# Patient Record
Sex: FEMALE | Race: BLACK OR AFRICAN AMERICAN | URBAN - METROPOLITAN AREA
[De-identification: names, ages, dates, MRNs, and addresses within clinical notes are randomized per-mention and may not be internally consistent; named-entity substitution may affect disease eponyms.]

---
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
## 2022-01-01 NOTE — PROGRESS NOTES
Chief Complaint   Patient presents with    Follow-up     1. Have you been to the ER, urgent care clinic since your last visit? Hospitalized since your last visit? No    2. Have you seen or consulted any other health care providers outside of the [address] [place] [address] since your last visit? Include any pap smears or colon screening.  No Progress NOTE  Date of Service: 2022  Christopher Mayorga MRN: [id number] [facility]: [id number]   Physical Exam  DOL: 44 GA: 35 wks 0 d CGA: 40 wks 4 d   BW: 2217 Weight: 2920 Change 24h: -105 Change 7d: 185   Place of Service: NICU Bed Type: Open Crib  Intensive Cardiac and respiratory monitoring, continuous and/or frequent vital sign monitoring  Vitals / Measurements: T: 98.5 HR: 179 RR: 50 BP: 100/55 (72)  Length: 46.7 (Change 24 hrs: --)OFC: 32 (Change 24 hrs: --)  General Exam: Infant is alert and active. Head/Neck: Anterior fontanel is soft and flat. Chest: Clear, equal breath sounds in room air. Comfortable. Heart: RRR. No murmur. Well perfused. Abdomen: Soft, non distended, non tender with active bowel sounds  Genitalia: Normal external male. Prior circ healing. Extremities: No deformities noted. Normal range of motion for all extremities. Neurologic: Mild generalized hypertonia  Skin: Pink, intact with no rashes, vesicles, or other lesions are noted. Procedures:     Medication  Active Medications:  Lactobacillus, Start Date: 2022, Duration: 36    Respiratory Support:   Type: Room Air Start Date: 2022Duration: 44    FEN/Nutrition   Daily Weight (g): 2920 Dry Weight (g): 2920 Weight Gain Over 7 Days (g): 165   Intake   Prior Enteral (Total Enteral: 138.7 mL/kg/d)   Base Feeding: FormulaSubtype Feeding: Similac SensitiveCal/Oz: 26Route: PO   mL/Feed: 50.7Feeds/d: 8mL/hr: 16.9Total (mL): 405Total (mL/kg/d): 138.7  Feeding Comment: PO ad aggie  Planned Enteral (Total Enteral: - mL/kg/d)   Base Feeding: FormulaSubtype Feeding: Similac SensitiveCal/Oz: 26Route: PO   Feeds/d: 8Total (mL): -Total (mL/kg/d): -  Output   Number of Voids: 9  Total Output     Stools: 4Last Stool Date: 2022    Diagnoses  System: FEN/GI   Diagnosis: Nutritional Support starting 2022         Assessment: Infant on all PO feeds since 12/13. He took ~140 mL/kg in the past 24 hours.  He's receiving 32 margaret/oz Similar Total Care Sensitive. Weight down 105 grams overnight, however weight  charted inconsistent with overall growth pattern and may be inaccurate. On prune juice and biogaia. Overall weight gain has been very consistent with infant gaining weight ~5th%ile. 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 Cocaine Abuse      Microcephaly (Q02) starting 2022       Comment: <1%tile       Assessment: Morphine D'cd on . OMAR scores 1-9 over the past 24 hours. Infant consolable. Sleeping between feeds and PO feeding well. Plan: Continue to assess MFS every 3 - 4 hours after feeding  monitor minimum of 48 hrs off morphine      Neuroimaging  Date: 2022Type: Cranial Ultrasound  Grade-L: NormalGrade-R: Normal  Comment: Normal results    Date: 2022Type: MRI  Grade-L: NormalGrade-R: Normal        System: Gestation   Diagnosis: Late  Infant 35 wks (P07.38) starting 2022       Comment: GA based on Mcdaniels assessment. No Prenatal Care (P00.9) starting 2022        Prematurity [id number] gm (P07.18) starting 2022         Assessment: Estimated 35wkr now 40 4/7 weeks PMA. He is stable in an open crib,  in room air, and now on all PO feedings. Trialing off pharmacologic management of Lisandro Sender has custody of infant, foster family identified and visiting. Plan: Continue NICU care, update foster family PRN.   Routine  screening prior to discharge  Continue consultation with Case Management and CPS  room in [facility] with foster family  tentative discharge plan for 12/27  requested follow up with pediatrician 12/29        System: Psychosocial Intervention   Diagnosis: Psychosocial Intervention starting 2022         Assessment: Infant currently in CPS custody. Korin Jennings family identified and visiting consistently. CPS worker present 12/21, brought [employer] to visit. CPS worker provided consent for infant to be circumcised per biologic family preferences. CPS worker noted that niece of MGM may be looking to apply for custody but that [employer] [employer] will be discharged with the foster family, future arrangements TBD. Discussed expected min 1 week timeline as earliest possible dc though certainly could be longer. CPS worker asked if discharge timing could be mindful of impending holidays to limit involvement of \"on-call\" worker rather than consistent  who is familiar with infant and hospitalization. Acknowledged that we will have as much advance planning as medically able. Plan: Continue consultation with Case Management and CPS  Maternal grandmother, Renea Moses, is allowed to call for updates; no visits without CPS worker  If family attempts to visit, contact CPS  listed in [facility]'s note      Attestation   The attending physician provided on-site coordination of the healthcare team inclusive of the advanced practitioner which included patient assessment, directing the patient's plan of care, and making decisions regarding the patient's management on this visit's date of service as reflected in the documentation above.    Authenticated by: ERLIN Seo   Date/Time: 2022 07:29    Authenticated by: Sol Padilla MD   Date/Time: 2022 14:00

## 2022-03-19 PROBLEM — Z28.21 INFLUENZA VACCINATION DECLINED: Status: ACTIVE | Noted: 2021-12-29

## 2022-06-02 ENCOUNTER — OFFICE VISIT (OUTPATIENT)
Dept: PEDIATRICS CLINIC | Age: 22
End: 2022-06-02
Payer: MEDICAID

## 2022-06-02 VITALS
WEIGHT: 124.2 LBS | OXYGEN SATURATION: 99 % | DIASTOLIC BLOOD PRESSURE: 62 MMHG | TEMPERATURE: 98.1 F | SYSTOLIC BLOOD PRESSURE: 98 MMHG | HEIGHT: 64 IN | RESPIRATION RATE: 16 BRPM | HEART RATE: 72 BPM | BODY MASS INDEX: 21.21 KG/M2
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
## 2022-06-02 DIAGNOSIS — S16.1XXD STRAIN OF NECK MUSCLE, SUBSEQUENT ENCOUNTER: Primary | ICD-10-CM

## 2022-06-02 PROCEDURE — 99213 OFFICE O/P EST LOW 20 MIN: CPT | Performed by: PEDIATRICS

## 2022-06-02 RX ORDER — CYCLOBENZAPRINE HCL 5 MG
TABLET ORAL
COMMUNITY
Start: 2022-05-26

## 2022-06-02 RX ORDER — DICLOFENAC SODIUM 75 MG/1
75 TABLET, DELAYED RELEASE ORAL 2 TIMES DAILY
COMMUNITY
Start: 2022-05-25 | End: 2023-05-25

## 2022-06-02 NOTE — LETTER
NOTIFICATION RETURN TO WORK / SCHOOL    6/2/2022 12:22 PM    Ms. Lilly Bowman  [address] [place] [address]      To Whom It May Concern:    Kermit Hinton is currently under the care of [address] [facility]. She will return to work/school on: 6/6/22    If there are questions or concerns please have the patient contact our office.         Sincerely,      Sudha Mcqueen MD
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
## 2022-11-16 ENCOUNTER — OFFICE VISIT (OUTPATIENT)
Dept: PEDIATRICS CLINIC | Age: 22
End: 2022-11-16
Payer: MEDICAID

## 2022-11-16 VITALS
TEMPERATURE: 97.6 F | HEART RATE: 66 BPM | DIASTOLIC BLOOD PRESSURE: 66 MMHG | WEIGHT: 121 LBS | HEIGHT: 64 IN | RESPIRATION RATE: 18 BRPM | BODY MASS INDEX: 20.66 KG/M2 | SYSTOLIC BLOOD PRESSURE: 104 MMHG | OXYGEN SATURATION: 100 %

## 2022-11-16 DIAGNOSIS — L30.1 DYSHYDROSIS: Primary | ICD-10-CM

## 2022-11-16 DIAGNOSIS — L20.9 ATOPIC DERMATITIS, UNSPECIFIED TYPE: ICD-10-CM

## 2022-11-16 PROCEDURE — 99214 OFFICE O/P EST MOD 30 MIN: CPT | Performed by: PEDIATRICS

## 2022-11-16 RX ORDER — TRIAMCINOLONE ACETONIDE 1 MG/G
OINTMENT TOPICAL
Qty: 80 G | Refills: 0 | Status: SHIPPED | OUTPATIENT
Start: 2022-11-16

## 2022-11-16 NOTE — PROGRESS NOTES
HISTORY OF PRESENT ILLNESS  Apolonia Pandya is a 25 y.o. female brought by mother. HPI  Rash  Lilly Bowman  complains of rash involving the armsleft, handsleft. Rash started 1 week ago. Appearance of rash at onset: Color of lesion(s): red, Texture of lesion(s): raised. Rash has changed over time-spreading. Initial distribution: left hand   Discomfort associated with rash: painful-hand, pruritic-arms, feels   Associated symptoms: feels warm. Denies: fever, sore throat. She  has not had previous evaluation of rash. She  has not had previous treatment. Response to treatment: tried Vaseline. Apolonia Pandya  has not had contacts with similar rash. She has not identified precipitant. She has not had new exposures   She has a history of atopic dermatitis       Patient Active Problem List    Diagnosis Date Noted    Influenza vaccination declined 12/29/2021    Vitamin D deficiency 10/30/2015    Allergic rhinitis 10/29/2015    Atopic dermatitis 10/29/2015     Current Outpatient Medications   Medication Sig Dispense Refill    diclofenac EC (VOLTAREN) 75 mg EC tablet Take 75 mg by mouth two (2) times a day. cyclobenzaprine (FLEXERIL) 5 mg tablet TAKE 1 TABLET BY MOUTH THREE TIMES DAILY FOR 10 DAYS      loratadine (CLARITIN) 10 mg tablet Take 1 Tablet by mouth daily as needed for Allergies. 30 Tablet 6    fluticasone propionate (FLONASE) 50 mcg/actuation nasal spray 2 Sprays by Both Nostrils route daily as needed for Rhinitis. 1 Bottle 6    triamcinolone acetonide (KENALOG) 0.1 % ointment Apply to affected areas twice daily as needed. 80 g 0    cholecalciferol (VITAMIN D3) (2,000 UNITS /50 MCG) cap capsule Take 1 Cap by mouth daily. 90 Cap 3     No Known Allergies    Review of Systems   Skin:  Positive for rash. All other systems reviewed and are negative.   Visit Vitals  /66 (BP 1 Location: Left upper arm, BP Patient Position: Sitting)   Pulse 66   Temp 97.6 °F (36.4 °C) (Oral) Resp 18   Ht 5' 4\" (1.626 m)   Wt 121 lb (54.9 kg)   SpO2 100%   BMI 20.77 kg/m²       Physical Exam  Vitals and nursing note reviewed. Constitutional:       General: She is not in acute distress. Appearance: Normal appearance. HENT:      Right Ear: Tympanic membrane normal.      Left Ear: Tympanic membrane normal.      Nose: Nose normal.      Mouth/Throat:      Mouth: Mucous membranes are moist.      Pharynx: Oropharynx is clear. Cardiovascular:      Rate and Rhythm: Normal rate and regular rhythm. Pulses: Normal pulses. Heart sounds: Normal heart sounds. Pulmonary:      Effort: Pulmonary effort is normal.      Breath sounds: Normal breath sounds. Musculoskeletal:      Cervical back: Normal range of motion and neck supple. Skin:     Findings: Rash (left hand with one firm blister noted, multiple tiny pink papules on left index finger, thumb, multiple erythematous papules on lateral outer left arm) present. Neurological:      Mental Status: She is alert and oriented to person, place, and time. ASSESSMENT and PLAN  Diagnoses and all orders for this visit:    1. Dyshydrosis  -     triamcinolone acetonide (KENALOG) 0.1 % ointment; Apply to affected areas twice daily as needed. 2. Atopic dermatitis, unspecified type  -     triamcinolone acetonide (KENALOG) 0.1 % ointment; Apply to affected areas twice daily as needed. Symptoms consistent with atopic dermatitis and dyshidrotic eczema left hand  Discussed scabies in differential  Triamcinolone and moisturizer  Recommend follow up if not improving    I have discussed the diagnosis with the patient and her mother and the intended plan as seen in the above orders. The patient has received an after-visit summary and questions were answered concerning future plans. I have discussed medication side effects and warnings with the patient as well. Follow-up and Dispositions    Return if symptoms worsen or fail to improve.

## 2023-04-11 PROBLEM — S06.0X0A CONCUSSION WITH NO LOSS OF CONSCIOUSNESS: Status: ACTIVE | Noted: 2023-04-11

## 2023-04-14 ENCOUNTER — OFFICE VISIT (OUTPATIENT)
Dept: PEDIATRICS CLINIC | Age: 23
End: 2023-04-14
Payer: MEDICAID

## 2023-04-14 VITALS
WEIGHT: 129 LBS | HEART RATE: 80 BPM | BODY MASS INDEX: 22.02 KG/M2 | RESPIRATION RATE: 18 BRPM | TEMPERATURE: 98.5 F | DIASTOLIC BLOOD PRESSURE: 76 MMHG | SYSTOLIC BLOOD PRESSURE: 110 MMHG | HEIGHT: 64 IN | OXYGEN SATURATION: 100 %

## 2023-04-14 DIAGNOSIS — J30.9 ALLERGIC RHINITIS, UNSPECIFIED SEASONALITY, UNSPECIFIED TRIGGER: ICD-10-CM

## 2023-04-14 DIAGNOSIS — S06.0X0D CONCUSSION WITHOUT LOSS OF CONSCIOUSNESS, SUBSEQUENT ENCOUNTER: Primary | ICD-10-CM

## 2023-04-14 DIAGNOSIS — R06.2 WHEEZING: ICD-10-CM

## 2023-04-14 PROCEDURE — 96160 PT-FOCUSED HLTH RISK ASSMT: CPT | Performed by: PEDIATRICS

## 2023-04-14 PROCEDURE — 99214 OFFICE O/P EST MOD 30 MIN: CPT | Performed by: PEDIATRICS

## 2023-04-14 RX ORDER — PREDNISONE 50 MG/1
50 TABLET ORAL DAILY
Qty: 5 TABLET | Refills: 0 | Status: SHIPPED | OUTPATIENT
Start: 2023-04-14 | End: 2023-04-19

## 2023-04-14 RX ORDER — ALBUTEROL SULFATE 90 UG/1
2-4 AEROSOL, METERED RESPIRATORY (INHALATION)
Qty: 1 EACH | Refills: 1 | Status: SHIPPED | OUTPATIENT
Start: 2023-04-14 | End: 2023-05-14

## 2023-04-17 PROBLEM — R06.2 WHEEZING: Status: ACTIVE | Noted: 2023-04-17

## 2023-06-06 RX ORDER — ALBUTEROL SULFATE 90 UG/1
AEROSOL, METERED RESPIRATORY (INHALATION)
Qty: 18 G | OUTPATIENT
Start: 2023-06-06

## 2023-06-06 NOTE — TELEPHONE ENCOUNTER
Called but was unable to reach [phone] [address], left a voice mail requesting a call back. Will request Halle Burnette to call her again - please review 5/23 call note. Rx for Albuterol sent at her last visit with Dr. Jude Alonzo on 4/14/2023 had 1 refill. If taking Albuterol frequently, will need to schedule follow-up appointment. thank you.

## 2024-08-26 ENCOUNTER — HOSPITAL ENCOUNTER (OUTPATIENT)
Facility: HOSPITAL | Age: 24
Discharge: HOME | End: 2024-08-29
Payer: MEDICAID

## 2024-08-26 DIAGNOSIS — S60.222A CONTUSION OF LEFT HAND, INITIAL ENCOUNTER: ICD-10-CM

## 2024-08-26 PROCEDURE — 73218 MRI UPPER EXTREMITY W/O DYE: CPT

## 2025-07-31 ENCOUNTER — HOSPITAL ENCOUNTER (EMERGENCY)
Facility: HOSPITAL | Age: 25
Discharge: HOME | End: 2025-07-31
Payer: MEDICAID

## 2025-07-31 ENCOUNTER — APPOINTMENT (OUTPATIENT)
Facility: HOSPITAL | Age: 25
End: 2025-07-31
Payer: MEDICAID

## 2025-07-31 VITALS
HEART RATE: 80 BPM | DIASTOLIC BLOOD PRESSURE: 60 MMHG | OXYGEN SATURATION: 100 % | RESPIRATION RATE: 18 BRPM | TEMPERATURE: 98 F | BODY MASS INDEX: 22.49 KG/M2 | WEIGHT: 128.97 LBS | SYSTOLIC BLOOD PRESSURE: 105 MMHG

## 2025-07-31 DIAGNOSIS — M25.531 RIGHT WRIST PAIN: Primary | ICD-10-CM

## 2025-07-31 PROCEDURE — 99283 EMERGENCY DEPT VISIT LOW MDM: CPT

## 2025-07-31 PROCEDURE — 6370000000 HC RX 637 (ALT 250 FOR IP)

## 2025-07-31 PROCEDURE — 73110 X-RAY EXAM OF WRIST: CPT

## 2025-07-31 RX ORDER — IBUPROFEN 600 MG/1
600 TABLET, FILM COATED ORAL EVERY 6 HOURS PRN
Qty: 56 TABLET | Refills: 0 | Status: SHIPPED | OUTPATIENT
Start: 2025-07-31 | End: 2025-08-14

## 2025-07-31 RX ORDER — IBUPROFEN 600 MG/1
600 TABLET, FILM COATED ORAL ONCE
Status: COMPLETED | OUTPATIENT
Start: 2025-07-31 | End: 2025-07-31

## 2025-07-31 RX ADMIN — IBUPROFEN 600 MG: 600 TABLET, FILM COATED ORAL at 15:38

## 2025-07-31 ASSESSMENT — PAIN DESCRIPTION - PAIN TYPE: TYPE: ACUTE PAIN

## 2025-07-31 ASSESSMENT — PAIN DESCRIPTION - LOCATION: LOCATION: WRIST

## 2025-07-31 ASSESSMENT — PAIN DESCRIPTION - ORIENTATION: ORIENTATION: RIGHT

## 2025-07-31 ASSESSMENT — PAIN DESCRIPTION - DESCRIPTORS: DESCRIPTORS: ACHING

## 2025-07-31 ASSESSMENT — PAIN SCALES - GENERAL: PAINLEVEL_OUTOF10: 8

## 2025-07-31 ASSESSMENT — PAIN - FUNCTIONAL ASSESSMENT: PAIN_FUNCTIONAL_ASSESSMENT: 0-10

## 2025-07-31 NOTE — ED NOTES
Pt presents to ED complaining of right posterior wrist pain exacerbated with wrist flexion x 1 day that worsened today. Pt is alert and oriented x 4, RR even and unlabored, skin is warm and dry. Pt appears in NAD at this time. Assessment completed and pt updated on plan of care.  Call bell in reach.   Emergency Department Nursing Plan of Care  The Nursing Plan of Care is developed from the Nursing assessment and Emergency Department Attending provider initial evaluation.  The plan of care may be reviewed in the “ED Provider note”.  The Plan of Care was developed with the following considerations:  Patient / Family readiness to learn indicated by:Refer to Medical chart in [facility]  Persons(s) to be included in education: Refer to Medical chart in [facility]  Barriers to Learning/Limitations:Normal

## 2025-07-31 NOTE — ED NOTES
Discharge instructions were given to the patient by Zhang Stovall RN  .  The patient left the Emergency Department alert and oriented and in no acute distress with 1 prescription(s). The patient was encouraged to call or return to the ED for worsening issues or problems and was encouraged to schedule a follow up appointment for continuing care.  The patient verbalized understanding of discharge instructions and prescriptions; all questions were answered. The patient has no further concerns at this time.

## 2025-07-31 NOTE — ED PROVIDER NOTES
[facility] EMERGENCY DEPARTMENT  EMERGENCY DEPARTMENT ENCOUNTER       Pt Name: Shirley Deng  MRN: [id number]  Birthdate 2000  Date of evaluation: 7/31/2025  Provider: Elmira Gudino PA-C   PCP: Mariia Oh MD  Note Started: 3:31 PM EDT 7/31/25     CHIEF COMPLAINT       Chief Complaint   Patient presents with    Wrist Pain        HISTORY OF PRESENT ILLNESS: 1 or more elements      History From: Patient, History limited by: none     Shirley Deng is a 24 y.o. female's past medical history as above presenting for evaluation of right wrist pain for the past 2 days, states it hurts worse today.  States [place].  Did not fall or hit her wrist on anything distinct.  No numbness or tingling in the hand.  No other acute complaints today.  No medications prior to arrival.       Please See MDM for Additional Details of the HPI/PMH  Nursing Notes were all reviewed and agreed with or any disagreements were addressed in the HPI.     REVIEW OF SYSTEMS        Positives and Pertinent negatives as per HPI.    PAST HISTORY     Past Medical History:  Past Medical History:   Diagnosis Date    BMI (body mass index), pediatric, 85% to less than 95% for age 8/5/2019       Past Surgical History:  History reviewed. No pertinent surgical history.    Family History:  Family History   Problem Relation Age of Onset    Headache Paternal Grandmother     Asthma Maternal Grandmother     Asthma Mother     Diabetes Maternal Grandmother        Social History:  Social History     Tobacco Use    Smoking status: Never    Smokeless tobacco: Never   Substance Use Topics    Alcohol use: No     Alcohol/week: 0.0 standard drinks of alcohol    Drug use: No       Allergies:  No Known Allergies    CURRENT MEDICATIONS      Previous Medications    CHOLECALCIFEROL 50 MCG (2000 UT) CAPS    Take by mouth daily    CYCLOBENZAPRINE (FLEXERIL) 5 MG TABLET    TAKE 1 TABLET BY

## 2025-07-31 NOTE — DISCHARGE INSTRUCTIONS
Take ibuprofen and Tylenol in an alternating fashion, taking 1000 milligrams of Tylenol every 8 hours and 600 mg of ibuprofen every 6 hours.  Follow-up with orthopedics with persistent pain. Return to ER with acute worsening symptoms such as chest pain, shortness of breath, abdominal pain, or other acute complaints. Thank you for choosing [facility] to be a part of your care today.